# Patient Record
Sex: MALE | Race: WHITE | NOT HISPANIC OR LATINO | URBAN - METROPOLITAN AREA
[De-identification: names, ages, dates, MRNs, and addresses within clinical notes are randomized per-mention and may not be internally consistent; named-entity substitution may affect disease eponyms.]

---

## 2022-10-29 ENCOUNTER — EMERGENCY (EMERGENCY)
Facility: HOSPITAL | Age: 52
LOS: 1 days | Discharge: HOME | End: 2022-10-29
Attending: STUDENT IN AN ORGANIZED HEALTH CARE EDUCATION/TRAINING PROGRAM | Admitting: STUDENT IN AN ORGANIZED HEALTH CARE EDUCATION/TRAINING PROGRAM
Payer: COMMERCIAL

## 2022-10-29 VITALS
HEART RATE: 117 BPM | SYSTOLIC BLOOD PRESSURE: 146 MMHG | HEIGHT: 74 IN | TEMPERATURE: 98 F | RESPIRATION RATE: 16 BRPM | WEIGHT: 244.93 LBS | DIASTOLIC BLOOD PRESSURE: 90 MMHG

## 2022-10-29 PROCEDURE — 99283 EMERGENCY DEPT VISIT LOW MDM: CPT

## 2022-10-29 RX ORDER — FLUORESCEIN SODIUM 9 MG
1 STRIP OPHTHALMIC (EYE) ONCE
Refills: 0 | Status: COMPLETED | OUTPATIENT
Start: 2022-10-29 | End: 2022-10-29

## 2022-10-29 RX ADMIN — Medication 1 DROP(S): at 19:32

## 2022-10-29 RX ADMIN — Medication 1 APPLICATION(S): at 19:32

## 2022-10-29 NOTE — ED PROVIDER NOTE - NS ED ROS FT
Constitutional: No reported recent fever.  Neurological: No reported acute headache.  Eyes: See HPI.   Ears, Nose, Mouth, Throat: No reported acute sore throat.  Cardiovascular: No reported current chest pain.  Respiratory: No reported new shortness of breath.  Gastrointestinal: No reported vomiting.  Genitourinary: No reported new urinary problems.  Musculoskeletal: No reported acute extremity pain.  Integumentary (skin and/or breast): No reported new rash.

## 2022-10-29 NOTE — ED PROVIDER NOTE - NSFOLLOWUPINSTRUCTIONS_ED_ALL_ED_FT
Please follow up at the Opthalmology clinic Monday at 10 AM.  The clinic is located [address] [place] [address] [place].   [phone]    Please follow up with your Primary Care Physician.  Please take your medications as prescribed.  If your symptoms persist or worsen, please seek care. Either return to the Emergency Department, go to urgent care or see your primary care doctor.  Please refer to general information and instructions below:       Visual Disturbances    An eye with a detached retina.   A visual disturbance is any problem that interferes with your normal vision. This can affect one eye or both eyes. Some types of visual disturbances come and go without treatment and do not cause a permanent problem. Other visual disturbances may be a sign of an eye emergency or a medical emergency.    Visual disturbances include:  •Blurred vision.      •Being unable to see certain colors.      •Being sensitive to light.      •Double vision in one eye or both eyes.      •Partial vision loss (visual field deficit).      •Being unaware of objects on one side of the body (visual spatial inattention).      •Rhythmic eye movements that you cannot control (nystagmus).      •Short-term or long-term blindness.    •Seeing:  •Floating spots or lines (floaters).      •Flashing or shimmering lights.      •Zigzagging lines or stars.      •The floor as tilted (visual midline shift).      •Things that are not really there (hallucinations).        Causes of visual disturbances include:  •Dry eyes.      •Eye infection.      •The thin membrane at the back of the eye  from the eyeball (retinal detachment).      •High blood pressure.      •Migraine.      •Glaucoma.      •Ischemic stroke.      •Cerebral aneurysm.      •Diabetes.      It is important to get your eyes checked by a health care provider or eye care specialist (ophthalmologist or optometrist) as soon as possible to determine the cause of your visual disturbance.      Follow these instructions at home:    •Take over-the-counter and prescription medicines only as told by your health care provider.      • Do not use any products that contain nicotine or tobacco. These products include cigarettes, chewing tobacco, and vaping devices, such as e-cigarettes. If you need help quitting, ask your health care provider.    •To lower your risk of the problems that can lead to visual disturbances:  •Eat a balanced diet that includes fruits and vegetables, whole grains, lean meat, and low-fat dairy.      •Maintain a healthy weight. Work with your health care provider to lose weight if you need to.      •Exercise regularly. Ask your health care provider what activities are safe for you.        • Do not drive if you have trouble seeing. Ask your health care provider for guidance about when it is and is not safe for you to drive.      •Keep all follow-up visits. This is important.        Contact a health care provider if:    •Your visual disturbance changes or becomes worse.        Get help right away if:  A sign showing the "BE FAST" categories for the warning signs of a stroke: balance, eyes, face, arms, speech, and time.   •You have new visual disturbances.      •You suddenly see flashing lights or floaters.      •You suddenly have a dark area in your field of vision, especially in the lower part. This can lead to a loss of central vision.      •You suddenly lose vision in one or both eyes.    •You have any symptoms of a stroke. "BE FAST" is an easy way to remember the main warning signs of a stroke:  •B - Balance. Signs are dizziness, sudden trouble walking, or loss of balance.      •E - Eyes. Signs are trouble seeing or a sudden change in vision.      •F - Face. Signs are sudden weakness or numbness of the face, or the face or eyelid drooping on one side.      •A - Arms. Signs are weakness or numbness in an arm. This happens suddenly and usually on one side of the body.      •S - Speech. Signs are sudden trouble speaking, slurred speech, or trouble understanding what people say.      •T - Time. Time to call emergency services. Write down what time symptoms started.      •Have other signs of a stroke, such as:  •A sudden, severe headache with no known cause.      •Nausea or vomiting.      •A seizure.        These symptoms may represent a serious problem that is an emergency. Do not wait to see if the symptoms will go away. Get medical help right away. Call your local emergency services (911 in the [place] Do not drive yourself to the hospital.       Summary    •A visual disturbance is any problem that interferes with your normal vision.      •It is important to get your eyes checked by a health care provider or eye care specialist to determine what kind of visual disturbance you have.      •Some visual disturbances may be a sign of an eye emergency or medical emergency.      This information is not intended to replace advice given to you by your health care provider. Make sure you discuss any questions you have with your health care provider.

## 2022-10-29 NOTE — ED PROVIDER NOTE - PATIENT PORTAL LINK FT
You can access the FollowMyHealth Patient Portal offered by [facility] by registering at the following website: [email or web address]. By joining [facility]’s FollowMyHealth portal, you will also be able to view your health information using other applications (apps) compatible with our system.
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
## 2022-10-29 NOTE — ED PROVIDER NOTE - CLINICAL SUMMARY MEDICAL DECISION MAKING FREE TEXT BOX
here with singular flash in right eye with eye pressure, symptoms resolved but patient with anxiety- states hr is always elevated in medical setting. will check vision, eye pressure, stain eye, discuss with ophthalmology.
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
## 2022-10-29 NOTE — ED ADULT NURSE REASSESSMENT NOTE - NS ED NURSE REASSESS COMMENT FT1
received report from ROSANGELA Grover.  pt a&ox3 sig other at bedside.  pt reports improvement and is back at his baseline.  states his brother reports the same symptoms and told him it's a migraine.  pt to follow up with valeriy.

## 2022-10-29 NOTE — ED PROVIDER NOTE - PHYSICAL EXAMINATION
Vital signs as available reviewed.  General:  No acute distress.  Head:  Normocephalic, atraumatic. no temporal tenderness to palpation.  Eyes:  Conjunctiva pink, no icterus. extra-occular eye movements intact, Pupils equal, round, reactive to light. IOP OD average 19 OS 20. vision right eye 20/160, left eye 20/100. no  Cardiovascular:  tachycardia.  Respiratory:  Clear to auscultation, good air entry bilaterally.  Abdomen:  Soft, non-tender.  Musculoskeletal:  No obvious deformity.  Neurologic: Alert and oriented, moving all extremities.  Skin:  Warm and dry.
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
## 2022-10-31 PROBLEM — I10 ESSENTIAL (PRIMARY) HYPERTENSION: Chronic | Status: ACTIVE | Noted: 2022-10-29

## 2022-11-21 ENCOUNTER — APPOINTMENT (OUTPATIENT)
Dept: OPHTHALMOLOGY | Facility: CLINIC | Age: 52
End: 2022-11-21